# Patient Record
Sex: FEMALE | Race: WHITE | Employment: STUDENT | URBAN - METROPOLITAN AREA
[De-identification: names, ages, dates, MRNs, and addresses within clinical notes are randomized per-mention and may not be internally consistent; named-entity substitution may affect disease eponyms.]

---

## 2020-09-23 ENCOUNTER — HOSPITAL ENCOUNTER (OUTPATIENT)
Dept: PHYSICAL THERAPY | Age: 20
Discharge: HOME | End: 2020-09-23
Payer: COMMERCIAL

## 2020-09-23 PROCEDURE — 97140 MANUAL THERAPY 1/> REGIONS: CPT

## 2020-09-23 PROCEDURE — 97110 THERAPEUTIC EXERCISES: CPT

## 2020-09-23 PROCEDURE — 97161 PT EVAL LOW COMPLEX 20 MIN: CPT

## 2020-09-23 NOTE — PROGRESS NOTES
Rossy Carrero  : 2000  Primary: Artur Stroud Of Aaron Oliveira*  Secondary:  Cynthia Moulton @ [place] [id number] [facility]  [phone]   [phone]      OUTPATIENT PHYSICAL THERAPY: Daily Treatment Note 2020  Visit Count:  1    ICD-10: Treatment Diagnosis: Cervicalgia (M54.2) and Stiffness of unspecified joint, not elsewhere classified (M25.60)  TREATMENT PLAN:  Effective Dates: 2020 TO 10/23/2020 (30 days). Frequency/Duration: 1 time a week for 30 Day(s)  GOALS: (Goals have been discussed and agreed upon with patient.)  Short-Term Functional Goals: Time Frame: 2 weeks  # Goal Status   1 Pt will confirm compliance with home program to facilitate improvement in function. NEW     Discharge goals: Time frame: 4 weeks   # Goal Status   1 Pt will demonstrate symptom-free cervical rotation without limitation to be able to look over shoulder for ADLs. NEW   2 Pt will be able to sleep with no significant increase in symptoms. NEW       Pre-treatment Symptoms/Complaints:  Pt reports no pain in neck at rest but sharp pain with rotation. Pain: Initial:   0/10 Post Session:  0/10, pt reported neck felt much better with AROM   Medications Last Reviewed: 2020  Updated Objective Findings: Below measures from initial evaluation unless otherwise noted. 20  Pain at worst: 7/10  Observation: Unremarkable  ROM: Slight pain with cervical flexion. Cervical extension WNL and pain-free. L rotation to 57 ° with pain, R rotation to 60 ° with pain. B UE ROM WNL and pain-free. UE ANDT: All within normal limits B  Palpation: TTP around C3 on bilateral sides of neck, R more tender than L.  NDI:     TREATMENT:   THERAPEUTIC ACTIVITY: (see chart for minutes): Therapeutic activities per grid below to improve mobility, strength, balance and coordination.   Required minimal visual, verbal and tactile cues to improve independence with functional mobility and activities. THERAPEUTIC EXERCISE: (see chart for minutes):  Exercises per grid below to improve mobility, strength, balance and coordination. Required minimal visual, verbal and tactile cues to promote proper body alignment and promote proper body mechanics. Progressed resistance, range, repetitions and complexity of movement as indicated. NEUROMUSCULAR RE-EDUCATION: (see chart for minutes):  Exercise/activities per grid below to improve balance, coordination, kinesthetic sense, posture, pain, and proprioception. Required minimal visual, verbal and tactile cues to promote motor control of bilateral, upper extremity(s). MANUAL THERAPY: (see chart for minutes): Joint mobilization, Soft tissue mobilization, skin mobilization, and muscle energy techniques were utilized and necessary because of the patient's restricted joint motion, restricted motion of soft tissue and pain . MODALITIES: (see chart for minutes): *  Hot Pack Therapy in order to provide analgesia. Date: 9/23/20 (visit 1)       Modalities:                Therapeutic Exercise: 8 min        Chin tuck + rotation: x3 B  Self-applied SNAG: x1 B using own hand, x1 B using towel  Pt educated on home program implementation and given printout. Neuromuscular re-education                Manual Therapy: 15 min        STM to posterior neck around C3. SNAG applied with active rotation to L with 10 s hold: x4 with no pain with motion. SNAG with R rotation applied with 10 s hold: x4 with slight pain on return. x3 with SNAG 1 segment lower and no more pain. Afterwards pt reported B rotation felt much better. Therapeutic Activities:                  Home program: 9/23/20: chin tuck + rotation, self-applied SNAG    [employer] Portal  Treatment/Session Summary:    · Response to Treatment: Pt responded well to treatment and demonstrated improved cervical rotation in both directions.    · Communication/Consultation:  None today  · Equipment provided today:  None today  · Recommendations/Intent for next treatment session: Next visit will focus on improving neck pain and AROM.     Total Treatment Billable Duration:  35 minutes  PT Patient Time In/Time Out  Time In: 8802  Time Out: [address], [facility] Luz Marina Urbano, PT, DPT    Future Appointments   Date Time Provider Corey Cerda   9/24/2020  7:00 PM [facility]   10/1/2020  3:30 PM [place] [facility]   10/2/2020  7:00 PM [place] [facility]   10/5/2020  9:30 AM [place] [facility]   10/7/2020 11:45 AM [place] [facility]   10/14/2020 11:45 AM [facility] [facility]   10/16/2020  4:15 PM [facility] [facility]   10/21/2020 11:45 AM [facility] [facility]   10/23/2020  4:15 PM [facility] [facility]   10/28/2020 11:45 AM [facility]   10/30/2020  4:15 PM [facility]

## 2020-09-23 NOTE — THERAPY EVALUATION
Juan Guan  : 2000  Primary: Waleska Leroy Of Aaron Oliveira*  Secondary:  [address] @ [place] [address].  [phone]   [phone]       OUTPATIENT PHYSICAL THERAPY:Initial Assessment 2020   ICD-10: Treatment Diagnosis: Cervicalgia (M54.2) and Stiffness of unspecified joint, not elsewhere classified (M25.60)  Precautions/Allergies:   Patient has no allergy information on record. Ambulatory/Rehab Services H2 Model Falls Risk Assessment    Risk Factors:       No Risk Factors Identified Ability to Rise from Chair:       (0)  Ability to rise in a single movement    Falls Prevention Plan:       No modifications necessary   Total: (5 or greater = High Risk): 0     [facility] [employer]. All Rights Reserved. [facility] [employer] Patent #[id number]. Federal Law prohibits the replication, distribution or use without written permission from [employer]      MEDICAL/REFERRING DIAGNOSIS:  Neck sprain, subsequent encounter [S13. 9XXD]   DATE OF ONSET: 20  REFERRING PHYSICIAN: Marilyn Nassar MD MD Orders: evaluate and treat  Return MD Appointment: TBD   INITIAL ASSESSMENT:  Ms. Chad Cuevas presents with impaired strength, ROM and pain of bilateral sides of neck . This limits turning tolerance and restricts ability to participate in ADLs and functional mobility. . Patient would benefit from skilled physical therapy to address above impairments. PROBLEM LIST (Impacting functional limitations):  1. Decreased Strength  2. Decreased ADL/Functional Activities  3. Increased Pain  4. Decreased Flexibility/Joint Mobility INTERVENTIONS PLANNED: (Treatment may consist of any combination of the following)  1. Cryotherapy  2. Electrical Stimulation  3. Heat  4. Home Exercise Program (HEP)  5. Manual Therapy  6. Neuromuscular Re-education/Strengthening  7. Therapeutic Activites  8.  Therapeutic Exercise/Strengthening   TREATMENT PLAN:  Effective Dates: 9/23/2020 TO 10/23/2020 (30 days). Frequency/Duration: 1 time a week for 30 Day(s)  GOALS: (Goals have been discussed and agreed upon with patient.)  Short-Term Functional Goals: Time Frame: 2 weeks  # Goal Status   1 Pt will confirm compliance with home program to facilitate improvement in function. NEW     Discharge goals: Time frame: 4 weeks   # Goal Status   1 Pt will demonstrate symptom-free cervical rotation without limitation to be able to look over shoulder for ADLs. NEW   2 Pt will be able to sleep with no significant increase in symptoms. NEW         Rehabilitation Potential For Stated Goals: Excellent  Regarding [address] [place] [address] Dunn Loring's therapy, I certify that the treatment plan above will be carried out by a therapist or under their direction. Thank you for this referral,  Jerry Swartz, PT, DPT     Referring Physician Signature: Shantel Rodriguez MD _______________________________ Date _____________     HISTORY:   History of Injury/Illness (Reason for Referral):  Pt reports around 1 month ago she woke up with a crick in her neck. She carried on like normal, assuming it would go away. She reports it did not go away. She reports she has no pain if she looks forward, but has sharp pain in center of neck with rotation. She reports it hurts rotating to either side, but rotating L is more painful than R. She tried muscle relaxers but they had no effect so she stopped. She had imaging and work up to rule out tumor and aneurysm which were all negative. She reports no radicular symptoms or pain with UE use. She reports it interferes with sleep since she is normally a side sleeper so she has been sleeping on back and looking up. She reports she is able to run without pain though playing tennis is painful. Past Medical History/Comorbidities:   Ms. Mansi Beal  has no past medical history on file. Ms. Mansi Beal  has no past surgical history on file.  Thyroid tumor removed (1/9/17), appendectomy ('16 or '17), gall bladder surgery (11/19)  Social History/Living Environment:     Pt lives with roommate in apartment. Prior Level of Function/Work/Activity:  Pt is a student at [employer] . Pt had unrestricted prior level of function. Current Medications:     No current outpatient medications on file. Levothydoxide, calcitrol, calcium, birth control. Date Last Reviewed:  9/23/2020    Number of Personal Factors/Comorbidities that affect the Plan of Care: 0: LOW COMPLEXITY   EXAMINATION:   Pain at worst: 7/10  Observation: Unremarkable  ROM: Slight pain with cervical flexion. Cervical extension WNL and pain-free. L rotation to 57 ° with pain, R rotation to 60 ° with pain. B UE ROM WNL and pain-free. UE ANDT: All within normal limits B  Palpation: TTP around C3 on bilateral sides of neck, R more tender than L. Body Structures Involved:  1. Joints  2. Muscles Body Functions Affected:  1. Sensory/Pain  2. Neuromusculoskeletal  3. Movement Related Activities and Participation Affected:  1. General Tasks and Demands  2. Self Care  3. Community, Social and [facility]   Number of elements (examined above) that affect the Plan of Care: 1-2: LOW COMPLEXITY   CLINICAL PRESENTATION:   Presentation: Stable and uncomplicated: LOW COMPLEXITY     CLINICAL DECISION MAKING:      OUTCOME MEASURE:   Initial outcome measure performed on 9/23/2020. Tool Used: Neck Disability Index (NDI)  Score:  Initial: 11/50  Most Recent: X/50 (Date: -- )   Interpretation of Score: The Neck Disability Index is a revised form of the Oswestry Low Back Pain Index and is designed to measure the activities of daily living in person's with neck pain. Each section is scored on a 0-5 scale, 5 representing the greatest disability. The scores of each section are added together for a total score of 50.        MEDICAL NECESSITY:   · Patient will benefit from skilled physical therapy to address their previously listed impairments in order to improve their independence with functional activities painfree. REASON FOR SERVICES/OTHER COMMENTS:  · Patient requires present interventions due to patient's inability to perform functional and recreational activities painfree.    Use of outcome tool(s) and clinical judgement create a POC that gives a: Clear prediction of patient's progress: LOW COMPLEXITY        Total Duration: 35 min  PT Patient Time In/Time Out  Time In: 1615  Time Out: [address], PT, DPT

## 2020-09-24 ENCOUNTER — APPOINTMENT (OUTPATIENT)
Dept: PHYSICAL THERAPY | Age: 20
End: 2020-09-24
Payer: COMMERCIAL

## 2020-10-01 ENCOUNTER — HOSPITAL ENCOUNTER (OUTPATIENT)
Dept: PHYSICAL THERAPY | Age: 20
Discharge: HOME | End: 2020-10-01
Payer: COMMERCIAL

## 2020-10-01 PROCEDURE — 97140 MANUAL THERAPY 1/> REGIONS: CPT

## 2020-10-01 PROCEDURE — 97110 THERAPEUTIC EXERCISES: CPT

## 2020-10-01 NOTE — PROGRESS NOTES
Brent Cisneros  : 2000  Primary: Jordyn Oneil Of [place] Tee*  Secondary:  [address] @ [facility]  [address] [place] [address], [facility] DAWIT Dockery.  [phone]   [phone]      OUTPATIENT PHYSICAL THERAPY: Daily Treatment Note 10/1/2020  Visit Count:  2    ICD-10: Treatment Diagnosis: Cervicalgia (M54.2) and Stiffness of unspecified joint, not elsewhere classified (M25.60)  TREATMENT PLAN:  Effective Dates: 2020 TO 10/23/2020 (30 days). Frequency/Duration: 1 time a week for 30 Day(s)  GOALS: (Goals have been discussed and agreed upon with patient.)  Short-Term Functional Goals: Time Frame: 2 weeks  # Goal Status   1 Pt will confirm compliance with home program to facilitate improvement in function. NEW     Discharge goals: Time frame: 4 weeks   # Goal Status   1 Pt will demonstrate symptom-free cervical rotation without limitation to be able to look over shoulder for ADLs. NEW   2 Pt will be able to sleep with no significant increase in symptoms. NEW       Pre-treatment Symptoms/Complaints:  Pt reports no pain at rest or looking straight ahead. She reports her neck is significantly better than last week but still painful with rotation. She reports she has more pain looking L than R.   Pain: Initial:   0/10, 5/10 looking L, 3/10 looking R Post Session:  0/10, pt reported neck \"feels a lot better\"    Medications Last Reviewed: 10/1/2020  Updated Objective Findings: Below measures from initial evaluation unless otherwise noted. 20  Pain at worst: 7/10  Observation: Unremarkable  ROM: Slight pain with cervical flexion. Cervical extension WNL and pain-free. L rotation to 57 ° with pain, R rotation to 60 ° with pain. B UE ROM WNL and pain-free. UE ANDT: All within normal limits B  Palpation: TTP around C3 on bilateral sides of neck, R more tender than L.  NDI:     TREATMENT:   THERAPEUTIC ACTIVITY: (see chart for minutes):   Therapeutic activities per grid below to improve mobility, strength, balance and coordination. Required minimal visual, verbal and tactile cues to improve independence with functional mobility and activities. THERAPEUTIC EXERCISE: (see chart for minutes):  Exercises per grid below to improve mobility, strength, balance and coordination. Required minimal visual, verbal and tactile cues to promote proper body alignment and promote proper body mechanics. Progressed resistance, range, repetitions and complexity of movement as indicated. NEUROMUSCULAR RE-EDUCATION: (see chart for minutes):  Exercise/activities per grid below to improve balance, coordination, kinesthetic sense, posture, pain, and proprioception. Required minimal visual, verbal and tactile cues to promote motor control of bilateral, upper extremity(s). MANUAL THERAPY: (see chart for minutes): Joint mobilization, Soft tissue mobilization, skin mobilization, and muscle energy techniques were utilized and necessary because of the patient's restricted joint motion, restricted motion of soft tissue and pain . MODALITIES: (see chart for minutes): *  Hot Pack Therapy in order to provide analgesia. Date: 9/23/20 (visit 1) 10/1/20 (visit 2)       Modalities:                Therapeutic Exercise: 8 min 15 min       Chin tuck + rotation: x3 B  Self-applied SNAG: x1 B using own hand, x1 B using towel  Pt educated on home program implementation and given printout. Prone elbow lift contract-relax: x10 B, performed slowly with awareness of cervical muscles. Prone head and elbow lift in rotation: x10 B with emphasis on relaxation after lift. Pt reported by end of each set she was able to feel muscles relax and it felt good. Additional x3 R to improve relaxation on this side. Neuromuscular re-education                Manual Therapy: 15 min 25 min       STM to posterior neck around C3. SNAG applied with active rotation to L with 10 s hold: x4 with no pain with motion.    SNAG with R rotation applied with 10 s hold: x4 with slight pain on return. x3 with SNAG 1 segment lower and no more pain. Afterwards pt reported B rotation felt much better. SNAG with rotation: x6 with 10 s hold in end-range rotation. Cross-friction massage to tender point in L cervical paraspinals  MET into SB: x3 B. Afterwards pt reported much less tension. Therapeutic Activities:                  Home program: 9/23/20: chin tuck + rotation, self-applied SNAG  10/1/20: prone head and elbow lift contract-relax, UT stretch    [employer] Portal  Treatment/Session Summary:    · Response to Treatment: Pt responded well to SNAG, MET, and prone contract-relax exercises. She was educated to perform prone contract-relax exercises at home to normalize muscular tension and she reported she would work on them. · Communication/Consultation:  None today  · Equipment provided today:  None today  · Recommendations/Intent for next treatment session: Next visit will focus on improving neck pain and AROM.     Total Treatment Billable Duration:  40 minutes  PT Patient Time In/Time Out  Time In: 7695  Time Out: [address] Len Duvall, PT, DPT    Future Appointments   Date Time Provider Corey Cerda   10/2/2020  7:00 PM [place] [facility]   10/7/2020 11:45 AM [place] [facility]   10/14/2020 11:45 AM [place] [facility]   10/16/2020  4:15 PM [place] [facility]   10/21/2020 11:45 AM [place] [facility]   10/23/2020  4:15 PM [place] [facility]   10/28/2020 11:45 AM [place] [facility]   10/30/2020  4:15 PM [facility]

## 2020-10-02 ENCOUNTER — APPOINTMENT (OUTPATIENT)
Dept: PHYSICAL THERAPY | Age: 20
End: 2020-10-02
Payer: COMMERCIAL

## 2020-10-05 ENCOUNTER — APPOINTMENT (OUTPATIENT)
Dept: PHYSICAL THERAPY | Age: 20
End: 2020-10-05
Payer: COMMERCIAL

## 2020-10-07 ENCOUNTER — HOSPITAL ENCOUNTER (OUTPATIENT)
Dept: PHYSICAL THERAPY | Age: 20
Discharge: HOME | End: 2020-10-07
Payer: COMMERCIAL

## 2020-10-16 ENCOUNTER — APPOINTMENT (OUTPATIENT)
Dept: PHYSICAL THERAPY | Age: 20
End: 2020-10-16
Payer: COMMERCIAL

## 2020-10-21 ENCOUNTER — APPOINTMENT (OUTPATIENT)
Dept: PHYSICAL THERAPY | Age: 20
End: 2020-10-21
Payer: COMMERCIAL

## 2020-10-23 ENCOUNTER — APPOINTMENT (OUTPATIENT)
Dept: PHYSICAL THERAPY | Age: 20
End: 2020-10-23
Payer: COMMERCIAL

## 2020-10-28 ENCOUNTER — APPOINTMENT (OUTPATIENT)
Dept: PHYSICAL THERAPY | Age: 20
End: 2020-10-28
Payer: COMMERCIAL

## 2020-10-30 ENCOUNTER — APPOINTMENT (OUTPATIENT)
Dept: PHYSICAL THERAPY | Age: 20
End: 2020-10-30
Payer: COMMERCIAL